# Patient Record
Sex: FEMALE | Race: ASIAN | NOT HISPANIC OR LATINO
[De-identification: names, ages, dates, MRNs, and addresses within clinical notes are randomized per-mention and may not be internally consistent; named-entity substitution may affect disease eponyms.]

---

## 2020-05-25 ENCOUNTER — TRANSCRIPTION ENCOUNTER (OUTPATIENT)
Age: 30
End: 2020-05-25

## 2020-05-26 ENCOUNTER — TRANSCRIPTION ENCOUNTER (OUTPATIENT)
Age: 30
End: 2020-05-26

## 2020-05-26 ENCOUNTER — INPATIENT (INPATIENT)
Facility: HOSPITAL | Age: 30
LOS: 1 days | Discharge: HOME | End: 2020-05-28
Attending: SPECIALIST | Admitting: SPECIALIST

## 2020-05-26 ENCOUNTER — EMERGENCY (EMERGENCY)
Facility: HOSPITAL | Age: 30
LOS: 1 days | Discharge: STILL ADMITTED OR ADMINISTRATIVE | End: 2020-05-26
Admitting: EMERGENCY MEDICINE

## 2020-05-26 VITALS
SYSTOLIC BLOOD PRESSURE: 130 MMHG | HEART RATE: 74 BPM | DIASTOLIC BLOOD PRESSURE: 71 MMHG | TEMPERATURE: 99 F | RESPIRATION RATE: 16 BRPM

## 2020-05-26 VITALS
OXYGEN SATURATION: 96 % | RESPIRATION RATE: 16 BRPM | TEMPERATURE: 99 F | DIASTOLIC BLOOD PRESSURE: 78 MMHG | SYSTOLIC BLOOD PRESSURE: 136 MMHG | HEART RATE: 78 BPM

## 2020-05-26 DIAGNOSIS — O42.92 FULL-TERM PREMATURE RUPTURE OF MEMBRANES, UNSPECIFIED AS TO LENGTH OF TIME BETWEEN RUPTURE AND ONSET OF LABOR: ICD-10-CM

## 2020-05-26 DIAGNOSIS — Z3A.00 WEEKS OF GESTATION OF PREGNANCY NOT SPECIFIED: ICD-10-CM

## 2020-05-26 DIAGNOSIS — O26.899 OTHER SPECIFIED PREGNANCY RELATED CONDITIONS, UNSPECIFIED TRIMESTER: ICD-10-CM

## 2020-05-26 LAB
BASOPHILS # BLD AUTO: 0.05 K/UL — SIGNIFICANT CHANGE UP (ref 0–0.2)
BASOPHILS NFR BLD AUTO: 0.6 % — SIGNIFICANT CHANGE UP (ref 0–2)
BLD GP AB SCN SERPL QL: NEGATIVE — SIGNIFICANT CHANGE UP
EOSINOPHIL # BLD AUTO: 0.17 K/UL — SIGNIFICANT CHANGE UP (ref 0–0.5)
EOSINOPHIL NFR BLD AUTO: 2 % — SIGNIFICANT CHANGE UP (ref 0–6)
HCT VFR BLD CALC: 40.9 % — SIGNIFICANT CHANGE UP (ref 34.5–45)
HGB BLD-MCNC: 13.7 G/DL — SIGNIFICANT CHANGE UP (ref 11.5–15.5)
IMM GRANULOCYTES NFR BLD AUTO: 4.9 % — HIGH (ref 0–1.5)
LYMPHOCYTES # BLD AUTO: 2.24 K/UL — SIGNIFICANT CHANGE UP (ref 1–3.3)
LYMPHOCYTES # BLD AUTO: 27 % — SIGNIFICANT CHANGE UP (ref 13–44)
MCHC RBC-ENTMCNC: 29.7 PG — SIGNIFICANT CHANGE UP (ref 27–34)
MCHC RBC-ENTMCNC: 33.5 % — SIGNIFICANT CHANGE UP (ref 32–36)
MCV RBC AUTO: 88.7 FL — SIGNIFICANT CHANGE UP (ref 80–100)
MONOCYTES # BLD AUTO: 0.75 K/UL — SIGNIFICANT CHANGE UP (ref 0–0.9)
MONOCYTES NFR BLD AUTO: 9 % — SIGNIFICANT CHANGE UP (ref 2–14)
NEUTROPHILS # BLD AUTO: 4.69 K/UL — SIGNIFICANT CHANGE UP (ref 1.8–7.4)
NEUTROPHILS NFR BLD AUTO: 56.5 % — SIGNIFICANT CHANGE UP (ref 43–77)
NRBC # FLD: 0 K/UL — SIGNIFICANT CHANGE UP (ref 0–0)
PLATELET # BLD AUTO: 182 K/UL — SIGNIFICANT CHANGE UP (ref 150–400)
PMV BLD: 11.8 FL — SIGNIFICANT CHANGE UP (ref 7–13)
RBC # BLD: 4.61 M/UL — SIGNIFICANT CHANGE UP (ref 3.8–5.2)
RBC # FLD: 15.8 % — HIGH (ref 10.3–14.5)
RH IG SCN BLD-IMP: POSITIVE — SIGNIFICANT CHANGE UP
RH IG SCN BLD-IMP: POSITIVE — SIGNIFICANT CHANGE UP
SARS-COV-2 RNA SPEC QL NAA+PROBE: SIGNIFICANT CHANGE UP
T PALLIDUM AB TITR SER: NEGATIVE — SIGNIFICANT CHANGE UP
WBC # BLD: 8.31 K/UL — SIGNIFICANT CHANGE UP (ref 3.8–10.5)
WBC # FLD AUTO: 8.31 K/UL — SIGNIFICANT CHANGE UP (ref 3.8–10.5)

## 2020-05-26 RX ORDER — AMPICILLIN TRIHYDRATE 250 MG
1 CAPSULE ORAL EVERY 4 HOURS
Refills: 0 | Status: DISCONTINUED | OUTPATIENT
Start: 2020-05-26 | End: 2020-05-26

## 2020-05-26 RX ORDER — CITRIC ACID/SODIUM CITRATE 300-500 MG
30 SOLUTION, ORAL ORAL ONCE
Refills: 0 | Status: COMPLETED | OUTPATIENT
Start: 2020-05-26 | End: 2020-05-26

## 2020-05-26 RX ORDER — MORPHINE SULFATE 50 MG/1
0.15 CAPSULE, EXTENDED RELEASE ORAL ONCE
Refills: 0 | Status: DISCONTINUED | OUTPATIENT
Start: 2020-05-26 | End: 2020-05-27

## 2020-05-26 RX ORDER — OXYCODONE HYDROCHLORIDE 5 MG/1
5 TABLET ORAL
Refills: 0 | Status: DISCONTINUED | OUTPATIENT
Start: 2020-05-26 | End: 2020-05-27

## 2020-05-26 RX ORDER — HYDROMORPHONE HYDROCHLORIDE 2 MG/ML
1 INJECTION INTRAMUSCULAR; INTRAVENOUS; SUBCUTANEOUS
Refills: 0 | Status: DISCONTINUED | OUTPATIENT
Start: 2020-05-26 | End: 2020-05-27

## 2020-05-26 RX ORDER — KETOROLAC TROMETHAMINE 30 MG/ML
30 SYRINGE (ML) INJECTION EVERY 6 HOURS
Refills: 0 | Status: DISCONTINUED | OUTPATIENT
Start: 2020-05-26 | End: 2020-05-27

## 2020-05-26 RX ORDER — NALOXONE HYDROCHLORIDE 4 MG/.1ML
0.1 SPRAY NASAL
Refills: 0 | Status: DISCONTINUED | OUTPATIENT
Start: 2020-05-26 | End: 2020-05-27

## 2020-05-26 RX ORDER — AMPICILLIN TRIHYDRATE 250 MG
2 CAPSULE ORAL ONCE
Refills: 0 | Status: COMPLETED | OUTPATIENT
Start: 2020-05-26 | End: 2020-05-26

## 2020-05-26 RX ORDER — SODIUM CHLORIDE 9 MG/ML
1000 INJECTION, SOLUTION INTRAVENOUS
Refills: 0 | Status: DISCONTINUED | OUTPATIENT
Start: 2020-05-26 | End: 2020-05-27

## 2020-05-26 RX ORDER — ONDANSETRON 8 MG/1
4 TABLET, FILM COATED ORAL EVERY 6 HOURS
Refills: 0 | Status: DISCONTINUED | OUTPATIENT
Start: 2020-05-26 | End: 2020-05-28

## 2020-05-26 RX ORDER — ACETAMINOPHEN 500 MG
975 TABLET ORAL EVERY 6 HOURS
Refills: 0 | Status: DISCONTINUED | OUTPATIENT
Start: 2020-05-26 | End: 2020-05-28

## 2020-05-26 RX ORDER — OXYTOCIN 10 UNIT/ML
333.33 VIAL (ML) INJECTION
Qty: 20 | Refills: 0 | Status: DISCONTINUED | OUTPATIENT
Start: 2020-05-26 | End: 2020-05-27

## 2020-05-26 RX ORDER — OXYCODONE HYDROCHLORIDE 5 MG/1
10 TABLET ORAL
Refills: 0 | Status: DISCONTINUED | OUTPATIENT
Start: 2020-05-26 | End: 2020-05-27

## 2020-05-26 RX ORDER — HEPARIN SODIUM 5000 [USP'U]/ML
5000 INJECTION INTRAVENOUS; SUBCUTANEOUS EVERY 12 HOURS
Refills: 0 | Status: DISCONTINUED | OUTPATIENT
Start: 2020-05-26 | End: 2020-05-28

## 2020-05-26 RX ORDER — SODIUM CHLORIDE 9 MG/ML
1000 INJECTION, SOLUTION INTRAVENOUS ONCE
Refills: 0 | Status: COMPLETED | OUTPATIENT
Start: 2020-05-26 | End: 2020-05-26

## 2020-05-26 RX ORDER — MAGNESIUM HYDROXIDE 400 MG/1
30 TABLET, CHEWABLE ORAL
Refills: 0 | Status: DISCONTINUED | OUTPATIENT
Start: 2020-05-26 | End: 2020-05-28

## 2020-05-26 RX ORDER — SODIUM CHLORIDE 9 MG/ML
1000 INJECTION, SOLUTION INTRAVENOUS
Refills: 0 | Status: DISCONTINUED | OUTPATIENT
Start: 2020-05-26 | End: 2020-05-26

## 2020-05-26 RX ORDER — OXYCODONE HYDROCHLORIDE 5 MG/1
5 TABLET ORAL
Refills: 0 | Status: DISCONTINUED | OUTPATIENT
Start: 2020-05-26 | End: 2020-05-28

## 2020-05-26 RX ORDER — METOCLOPRAMIDE HCL 10 MG
10 TABLET ORAL ONCE
Refills: 0 | Status: COMPLETED | OUTPATIENT
Start: 2020-05-26 | End: 2020-05-26

## 2020-05-26 RX ORDER — LANOLIN
1 OINTMENT (GRAM) TOPICAL EVERY 6 HOURS
Refills: 0 | Status: DISCONTINUED | OUTPATIENT
Start: 2020-05-26 | End: 2020-05-28

## 2020-05-26 RX ORDER — DIPHENHYDRAMINE HCL 50 MG
25 CAPSULE ORAL EVERY 6 HOURS
Refills: 0 | Status: DISCONTINUED | OUTPATIENT
Start: 2020-05-26 | End: 2020-05-28

## 2020-05-26 RX ORDER — TETANUS TOXOID, REDUCED DIPHTHERIA TOXOID AND ACELLULAR PERTUSSIS VACCINE, ADSORBED 5; 2.5; 8; 8; 2.5 [IU]/.5ML; [IU]/.5ML; UG/.5ML; UG/.5ML; UG/.5ML
0.5 SUSPENSION INTRAMUSCULAR ONCE
Refills: 0 | Status: DISCONTINUED | OUTPATIENT
Start: 2020-05-26 | End: 2020-05-28

## 2020-05-26 RX ORDER — OXYTOCIN 10 UNIT/ML
333.33 VIAL (ML) INJECTION
Qty: 20 | Refills: 0 | Status: DISCONTINUED | OUTPATIENT
Start: 2020-05-26 | End: 2020-05-26

## 2020-05-26 RX ORDER — FAMOTIDINE 10 MG/ML
20 INJECTION INTRAVENOUS ONCE
Refills: 0 | Status: COMPLETED | OUTPATIENT
Start: 2020-05-26 | End: 2020-05-26

## 2020-05-26 RX ORDER — SIMETHICONE 80 MG/1
80 TABLET, CHEWABLE ORAL EVERY 4 HOURS
Refills: 0 | Status: DISCONTINUED | OUTPATIENT
Start: 2020-05-26 | End: 2020-05-28

## 2020-05-26 RX ADMIN — Medication 30 MILLIGRAM(S): at 18:20

## 2020-05-26 RX ADMIN — Medication 10 MILLIGRAM(S): at 06:59

## 2020-05-26 RX ADMIN — SODIUM CHLORIDE 2000 MILLILITER(S): 9 INJECTION, SOLUTION INTRAVENOUS at 06:31

## 2020-05-26 RX ADMIN — ONDANSETRON 4 MILLIGRAM(S): 8 TABLET, FILM COATED ORAL at 14:49

## 2020-05-26 RX ADMIN — Medication 30 MILLIGRAM(S): at 11:40

## 2020-05-26 RX ADMIN — HEPARIN SODIUM 5000 UNIT(S): 5000 INJECTION INTRAVENOUS; SUBCUTANEOUS at 18:20

## 2020-05-26 RX ADMIN — FAMOTIDINE 20 MILLIGRAM(S): 10 INJECTION INTRAVENOUS at 06:59

## 2020-05-26 RX ADMIN — SODIUM CHLORIDE 75 MILLILITER(S): 9 INJECTION, SOLUTION INTRAVENOUS at 11:04

## 2020-05-26 RX ADMIN — Medication 1000 MILLIUNIT(S)/MIN: at 11:04

## 2020-05-26 RX ADMIN — Medication 30 MILLILITER(S): at 07:54

## 2020-05-26 RX ADMIN — Medication 216 GRAM(S): at 06:42

## 2020-05-26 NOTE — OB PROVIDER H&P - ASSESSMENT
28 y/o [place] female, para 1001 @ 38+5 wks spontaneous di/di twins IUP.  Evidence of PROM @ 4:30am (Clear)  GBS Positive  Cephalic/Breech presentation by bedside sonogram.   Plan of care d/w Dr Jeong  Report off to Dr Sahu-Erick [facility]3] done.

## 2020-05-26 NOTE — BRIEF OPERATIVE NOTE - NSICDXBRIEFPOSTOP_GEN_ALL_CORE_FT
POST-OP DIAGNOSIS:  Breech presentation, delivered, current hospitalization 26-May-2020 11:20:16  Emily Ryan S

## 2020-05-26 NOTE — OB PROVIDER H&P - NSHPPHYSICALEXAM_GEN_ALL_CORE
30 y/o  female, para 1001 @ 38+5 wks spontaneous di/di twins IUP.  Evidence of PROM   Gen: NAD; A+O x3  Cardiac: S1/S2, R/R/R  Pulm: CTAB  VS-BP: 118/67, P74, RR16, T 37.0, Pain 3/10  Fetus A: Cat 1 tracing, 135 bpm moderate variability, +accels, no decels  Fetus B: Cat 1 tracing, 135 bpm, moderate variability, +accels, no decels  [place]: q 2-4 min  SSE: Copious amount of clear noted. Nitrazine Positive  SVE: 2/50/-3, Ruptured @ 4:30am.  GBS Positive  TAS: Vertex/Breech

## 2020-05-26 NOTE — BRIEF OPERATIVE NOTE - OPERATION/FINDINGS
infant A: breech female  infant B: vertex male  uterus, tubes and ovaries all grossly intact and normal  Placenta and both 3 vessel cords grossly unremarkable

## 2020-05-26 NOTE — OB PROVIDER TRIAGE NOTE - HISTORY OF PRESENT ILLNESS
Patient of Dr Jeong   28 y/o  female, para 1001 @ 38+5 wks spontaneous di/di twins IUP.  Presents with c/o several gushes of fluid since 4:30am, followed by abdominal pressure.   Pt reports +FM x2, denies any vaginal bleeding, contractions or cramping.     A/P Complications: Positive GBS Bacteruria   Allergies: NKDA  Meds: PNV, Folic acid, Iron  PMH: Denies  PSH: Denies  OBgynHx    2016-Uncomplicated  @ 40+3 wks. Male, 7.6#  Patient denies any h/o: Abn. PAP, ovarian cysts, fibroids, breast problems, STDs/STIs  PSY: Denies  EtOH/ Smoke/ Recreational substance use: Denies  FH: Diabetes (Mother)  H/W/BMI: 63"/160#/28.4 Patient of Dr Jeong   30 y/o  female, para 1001 @ 38+5 wks spontaneous di/di twins IUP.  Presents with c/o several gushes of fluid since 4:30am, followed by abdominal pressure.   Pt reports +FM x2, denies any vaginal bleeding, contractions or cramping.   Patient scheduled for Primary c/s on 2020 for vertex/breech  NPO since 10PM    A/P Complications: Positive GBS Bacteruria   Allergies: NKDA  Meds: PNV, Folic acid, Iron  PMH: Denies  PSH: Denies  OBgynHx    2016-Uncomplicated  @ 40+3 wks. Male, 7.6#  Patient denies any h/o: Abn. PAP, ovarian cysts, fibroids, breast problems, STDs/STIs  PSY: Denies  EtOH/ Smoke/ Recreational substance use: Denies  FH: Diabetes (Mother)  H/W/BMI: 63"/160#/28.4

## 2020-05-26 NOTE — OB PROVIDER TRIAGE NOTE - NS_OBGYNHISTORY_OBGYN_ALL_OB_FT
2016-Uncomplicated  @ 40+3 wks. Male, 7.6#  Patient denies any h/o: Abn. PAP, ovarian cysts, fibroids, breast problems, STDs/STIs

## 2020-05-26 NOTE — OB NEONATOLOGY/PEDIATRICIAN DELIVERY SUMMARY - NSPEDSNEONOTESA_OBGYN_ALL_OB_FT
Baby is a 38+5 wk GA female born to a 29 y/o  mother via CS, unscheduled. Maternal history uncomplicated. Prenatal history uncomplicated. Maternal blood type O+. Prenatal labs negative, non-reactive, and immune. GBS positive on . She received ampicillin at 6:43 am. SROM clear fluids at 04:30 on . Baby born breech, vigorous and crying spontaneously. Warmed, dried, stimulated, suctioned. Apgars 9/9. Mom's highest temp 36.8. EOS 0.04. Mom plans to breastfeed, would like to defer hepB.

## 2020-05-26 NOTE — OB PROVIDER H&P - HISTORY OF PRESENT ILLNESS
Patient of Dr Jeong   30 y/o  female, para 1001 @ 38+5 wks spontaneous di/di twins IUP.  Presents with c/o several gushes of fluid since 4:30am, followed by abdominal pressure.   Pt reports +FM x2, denies any vaginal bleeding, contractions or cramping.   Patient scheduled for Primary c/s on 2020 for vertex/breech  NPO since 10PM    A/P Complications: Positive GBS Bacteruria   Allergies: NKDA  Meds: PNV, Folic acid, Iron  PMH: Denies  PSH: Denies  OBgynHx    2016-Uncomplicated  @ 40+3 wks. Male, 7.6#  Patient denies any h/o: Abn. PAP, ovarian cysts, fibroids, breast problems, STDs/STIs  PSY: Denies  EtOH/ Smoke/ Recreational substance use: Denies  FH: Diabetes (Mother)  H/W/BMI: 63"/160#/28.4

## 2020-05-26 NOTE — OB PROVIDER TRIAGE NOTE - NSHPPHYSICALEXAM_GEN_ALL_CORE
30 y/o  female, para 1001 @ 38+5 wks spontaneous di/di twins IUP.  Evidence of PROM   Gen: NAD; A+O x3  Cardiac: S1/S2, R/R/R  Pulm: CTAB  VS-BP: 118/67, P74, RR16, T 37.0, Pain 3/10  Fetus A: Cat 1 tracing, 135 bpm moderate variability, +accels, no decels  Fetus B: Cat 1 tracing, 135 bpm, moderate variability, +accels, no decels  [place]: q 2-4 min  SSE: Copious amount of clear noted. Nitrazine Positive  SVE: 2/50/-3, Ruptured @ 4:30am.  GBS Positive  TAS: Vertex/Breech

## 2020-05-26 NOTE — DISCHARGE NOTE OB - CARE PLAN
Principal Discharge DX:	 delivery delivered  Goal:	GOOD RECOVERY  Assessment and plan of treatment:	F/U 2 WEEKS

## 2020-05-26 NOTE — OB RN DELIVERY SUMMARY - NS_SEPSISRSKCALC_OBGYN_ALL_OB_FT
EOS calculated successfully. EOS Risk Factor: 0.5/1000 live births ([facility] national incidence); GA=38w5d; Temp=98.6; ROM=4.433; GBS='Positive'; Antibiotics='GBS specific antibiotics > 2 hrs prior to birth'

## 2020-05-26 NOTE — DISCHARGE NOTE OB - CARE PROVIDER_API CALL
Lisa Jeong  OBSTETRICS AND GYNECOLOGY  [address] [place] [address] [address]  [place] [address]  Phone: [phone]  Fax: [phone]  Follow Up Time:

## 2020-05-26 NOTE — OB NEONATOLOGY/PEDIATRICIAN DELIVERY SUMMARY - NSPEDSNEONOTESB_OBGYN_ALL_OB_FT
Baby "SHANNON" is a 38 and 5 week M twin born via C/S to a [id number] mother, presented in labor with SROM @430 clear. GBS+ 5/4, treated, PNl neg/NR/I. Baby "SHANNON" emerged vertex and crying, no delayed cord clamping, WDSS< APGARS 9,9. Breast and bottle, no hep b, circ. NBN.

## 2020-05-26 NOTE — OB PROVIDER DELIVERY SUMMARY - AS DELIV COMPLICATIONS OB
Consult received. History, labs and CT scan images reviewed. Pan colitis of unknown etiology.   Start Cipro/flagyl    Rosealee Clamp, DO none

## 2020-05-26 NOTE — ED ADULT TRIAGE NOTE - CHIEF COMPLAINT QUOTE
pt 38 weeks pregnant,  (pregnant with twins), NIRMAL- - scheduled . +ROM @ 430am. states some mild discomfort, no contractions. GYN- Steve . spoke to Sierra in L+D, pt taken to L+D by EDT.

## 2020-05-26 NOTE — DISCHARGE NOTE OB - PATIENT PORTAL LINK FT
You can access the FollowMyHealth Patient Portal offered by [facility] by registering at the following website: [email or web address]. By joining [employer]’s FollowMyHealth portal, you will also be able to view your health information using other applications (apps) compatible with our system.

## 2020-05-26 NOTE — OB PROVIDER H&P - PROBLEM SELECTOR PLAN 1
Admit to L&D  -For Primary c/s in the setting of Twins/PROM/Cephalic/Breech  -Routine labs/ Covid PCR  -Ampicillin GBS Prophylaxis  -Anesthesia Consult

## 2020-05-26 NOTE — OB PROVIDER TRIAGE NOTE - NSHPLABSRESULTS_GEN_ALL_CORE
Vital Signs Last 24 Hrs  T(C): 37 (26 May 2020 05:55), Max: 37 (26 May 2020 05:22)  T(F): 98.6 (26 May 2020 05:55), Max: 98.6 (26 May 2020 05:22)  HR: 74 (26 May 2020 06:06) (74 - 78)  BP: 118/67 (26 May 2020 05:55) (118/67 - 136/78)  BP(mean): --  RR: 16 (26 May 2020 05:55) (16 - 16)  SpO2: 96% (26 May 2020 05:22) (96% - 96%)

## 2020-05-27 DIAGNOSIS — Z01.818 ENCOUNTER FOR OTHER PREPROCEDURAL EXAMINATION: ICD-10-CM

## 2020-05-27 PROBLEM — Z00.00 ENCOUNTER FOR PREVENTIVE HEALTH EXAMINATION: Status: ACTIVE | Noted: 2020-05-27

## 2020-05-27 LAB
BASOPHILS # BLD AUTO: 0.03 K/UL — SIGNIFICANT CHANGE UP (ref 0–0.2)
BASOPHILS NFR BLD AUTO: 0.3 % — SIGNIFICANT CHANGE UP (ref 0–2)
EOSINOPHIL # BLD AUTO: 0.03 K/UL — SIGNIFICANT CHANGE UP (ref 0–0.5)
EOSINOPHIL NFR BLD AUTO: 0.3 % — SIGNIFICANT CHANGE UP (ref 0–6)
HCT VFR BLD CALC: 26.8 % — LOW (ref 34.5–45)
HGB BLD-MCNC: 9.3 G/DL — LOW (ref 11.5–15.5)
IMM GRANULOCYTES NFR BLD AUTO: 2 % — HIGH (ref 0–1.5)
LYMPHOCYTES # BLD AUTO: 1.94 K/UL — SIGNIFICANT CHANGE UP (ref 1–3.3)
LYMPHOCYTES # BLD AUTO: 19.8 % — SIGNIFICANT CHANGE UP (ref 13–44)
MCHC RBC-ENTMCNC: 31.4 PG — SIGNIFICANT CHANGE UP (ref 27–34)
MCHC RBC-ENTMCNC: 34.7 % — SIGNIFICANT CHANGE UP (ref 32–36)
MCV RBC AUTO: 90.5 FL — SIGNIFICANT CHANGE UP (ref 80–100)
MONOCYTES # BLD AUTO: 0.82 K/UL — SIGNIFICANT CHANGE UP (ref 0–0.9)
MONOCYTES NFR BLD AUTO: 8.4 % — SIGNIFICANT CHANGE UP (ref 2–14)
NEUTROPHILS # BLD AUTO: 6.78 K/UL — SIGNIFICANT CHANGE UP (ref 1.8–7.4)
NEUTROPHILS NFR BLD AUTO: 69.2 % — SIGNIFICANT CHANGE UP (ref 43–77)
NRBC # FLD: 0.02 K/UL — SIGNIFICANT CHANGE UP (ref 0–0)
PLATELET # BLD AUTO: 175 K/UL — SIGNIFICANT CHANGE UP (ref 150–400)
PMV BLD: 12.3 FL — SIGNIFICANT CHANGE UP (ref 7–13)
RBC # BLD: 2.96 M/UL — LOW (ref 3.8–5.2)
RBC # FLD: 15.5 % — HIGH (ref 10.3–14.5)
WBC # BLD: 9.8 K/UL — SIGNIFICANT CHANGE UP (ref 3.8–10.5)
WBC # FLD AUTO: 9.8 K/UL — SIGNIFICANT CHANGE UP (ref 3.8–10.5)

## 2020-05-27 RX ORDER — FERROUS SULFATE 325(65) MG
325 TABLET ORAL THREE TIMES A DAY
Refills: 0 | Status: DISCONTINUED | OUTPATIENT
Start: 2020-05-27 | End: 2020-05-28

## 2020-05-27 RX ORDER — IBUPROFEN 200 MG
600 TABLET ORAL EVERY 6 HOURS
Refills: 0 | Status: COMPLETED | OUTPATIENT
Start: 2020-05-27 | End: 2021-04-25

## 2020-05-27 RX ORDER — ASCORBIC ACID 60 MG
500 TABLET,CHEWABLE ORAL DAILY
Refills: 0 | Status: DISCONTINUED | OUTPATIENT
Start: 2020-05-27 | End: 2020-05-28

## 2020-05-27 RX ORDER — FERROUS SULFATE 325(65) MG
325 TABLET ORAL DAILY
Refills: 0 | Status: DISCONTINUED | OUTPATIENT
Start: 2020-05-27 | End: 2020-05-27

## 2020-05-27 RX ORDER — IBUPROFEN 200 MG
600 TABLET ORAL EVERY 6 HOURS
Refills: 0 | Status: DISCONTINUED | OUTPATIENT
Start: 2020-05-27 | End: 2020-05-28

## 2020-05-27 RX ADMIN — Medication 600 MILLIGRAM(S): at 12:16

## 2020-05-27 RX ADMIN — HEPARIN SODIUM 5000 UNIT(S): 5000 INJECTION INTRAVENOUS; SUBCUTANEOUS at 06:36

## 2020-05-27 RX ADMIN — Medication 975 MILLIGRAM(S): at 15:25

## 2020-05-27 RX ADMIN — Medication 30 MILLIGRAM(S): at 06:36

## 2020-05-27 RX ADMIN — Medication 600 MILLIGRAM(S): at 18:19

## 2020-05-27 RX ADMIN — SIMETHICONE 80 MILLIGRAM(S): 80 TABLET, CHEWABLE ORAL at 15:26

## 2020-05-27 RX ADMIN — Medication 30 MILLIGRAM(S): at 00:30

## 2020-05-27 RX ADMIN — Medication 975 MILLIGRAM(S): at 21:25

## 2020-05-27 RX ADMIN — HEPARIN SODIUM 5000 UNIT(S): 5000 INJECTION INTRAVENOUS; SUBCUTANEOUS at 18:20

## 2020-05-27 RX ADMIN — Medication 1 TABLET(S): at 15:16

## 2020-05-27 RX ADMIN — Medication 500 MILLIGRAM(S): at 15:16

## 2020-05-27 RX ADMIN — SIMETHICONE 80 MILLIGRAM(S): 80 TABLET, CHEWABLE ORAL at 18:20

## 2020-05-27 RX ADMIN — Medication 325 MILLIGRAM(S): at 15:16

## 2020-05-27 NOTE — PROVIDER CONTACT NOTE (OTHER) - SITUATION
patient status post straight cath from this morning ,unable to urinate after three attempts, bladder palpable and patient complains of feeling  pressure

## 2020-05-27 NOTE — PROVIDER CONTACT NOTE (OTHER) - ACTION/TREATMENT ORDERED:
catheterized and obtained 600 cc clear urine, NP notified and indwelling flowers left in place as ordered. flowers to be d/cd at [facility]

## 2020-05-27 NOTE — PROGRESS NOTE ADULT - SUBJECTIVE AND OBJECTIVE BOX
Postop Day  __1_ s/p   C- Section    THERAPY:  [ x ] Spinal morphine   [  ] Epidural morphine   [  ] IV PCA Hydromorphone 1 mg/ml    T(C): 36.6 (05-27-20 @ 10:10), Max: 36.8 (05-27-20 @ 06:38)  HR: 87 (05-27-20 @ 10:10) (78 - 87)  BP: 118/62 (05-27-20 @ 10:10) (106/71 - 118/62)  RR: 18 (05-27-20 @ 10:10) (16 - 18)  SpO2: 98% (05-27-20 @ 10:10) (98% - 100%)    MEDICATIONS  (STANDING):  acetaminophen   Tablet .. 975 milliGRAM(s) Oral every 6 hours  ascorbic acid 500 milliGRAM(s) Oral daily  diphtheria/tetanus/pertussis (acellular) Vaccine (ADAcel) 0.5 milliLiter(s) IntraMuscular once  ferrous    sulfate 325 milliGRAM(s) Oral three times a day  heparin   Injectable 5000 Unit(s) SubCutaneous every 12 hours  prenatal multivitamin 1 Tablet(s) Oral daily    MEDICATIONS  (PRN):  diphenhydrAMINE 25 milliGRAM(s) Oral every 6 hours PRN Itching  lanolin Ointment 1 Application(s) Topical every 6 hours PRN Sore Nipples  magnesium hydroxide Suspension 30 milliLiter(s) Oral two times a day PRN Constipation  naloxone Injectable 0.1 milliGRAM(s) IV Push every 3 minutes PRN For ANY of the following changes in patient status:  A. RR LESS THAN 10 breaths per minute, B. Oxygen saturation LESS THAN 90%, C. Sedation score of 6  ondansetron Injectable 4 milliGRAM(s) IV Push every 6 hours PRN Nausea  oxyCODONE    IR 5 milliGRAM(s) Oral every 3 hours PRN Moderate Pain (4 - 6)  simethicone 80 milliGRAM(s) Chew every 4 hours PRN Gas      Pain:   _________mild __ at rest;  ______moderate_____with activity    Sedation Score:	  [ x ] Alert	    [  ] Drowsy        [  ] Arousable	[  ] Asleep	[  ] Unresponsive    Side Effects:	  [  x] None	     [  ] Nausea        [  ] Pruritus        [  ] Weakness   [  ] Numbness        ASSESSMENT/ PLAN  [   ] Side effects resolving      [ x  ] Patient made aware of PRN meds available     [ x] Discontinue & switch to PRN pain medications        [  ] Continue       Patient states lower extremities feel and move normally. No apparent anesthetic complications.

## 2020-05-28 ENCOUNTER — APPOINTMENT (OUTPATIENT)
Dept: DISASTER EMERGENCY | Facility: CLINIC | Age: 30
End: 2020-05-28

## 2020-05-28 VITALS
DIASTOLIC BLOOD PRESSURE: 70 MMHG | TEMPERATURE: 98 F | SYSTOLIC BLOOD PRESSURE: 110 MMHG | HEART RATE: 74 BPM | OXYGEN SATURATION: 99 % | RESPIRATION RATE: 18 BRPM

## 2020-05-28 RX ORDER — BENZOYL PEROXIDE MICRONIZED 5.8 %
1 TOWELETTE (EA) TOPICAL
Qty: 0 | Refills: 0 | DISCHARGE

## 2020-05-28 RX ORDER — IBUPROFEN 200 MG
1 TABLET ORAL
Qty: 0 | Refills: 0 | DISCHARGE
Start: 2020-05-28

## 2020-05-28 RX ORDER — FOLIC ACID 0.8 MG
1 TABLET ORAL
Qty: 0 | Refills: 0 | DISCHARGE

## 2020-05-28 RX ORDER — ACETAMINOPHEN 500 MG
3 TABLET ORAL
Qty: 0 | Refills: 0 | DISCHARGE
Start: 2020-05-28

## 2020-05-28 RX ADMIN — Medication 600 MILLIGRAM(S): at 00:11

## 2020-05-28 RX ADMIN — Medication 600 MILLIGRAM(S): at 06:10

## 2020-05-28 RX ADMIN — Medication 325 MILLIGRAM(S): at 06:11

## 2020-05-28 RX ADMIN — Medication 975 MILLIGRAM(S): at 03:28

## 2020-05-28 RX ADMIN — HEPARIN SODIUM 5000 UNIT(S): 5000 INJECTION INTRAVENOUS; SUBCUTANEOUS at 06:10

## 2020-05-28 RX ADMIN — SIMETHICONE 80 MILLIGRAM(S): 80 TABLET, CHEWABLE ORAL at 00:12

## 2020-05-28 NOTE — PROGRESS NOTE ADULT - SUBJECTIVE AND OBJECTIVE BOX
Postpartum Note,  Section  She is a  29y woman who is now post-operative day: 2    Subjective:  The patient feels well.  She is ambulating.   She is tolerating regular diet.  She denies nausea and vomiting.  She is voiding.  Her pain is controlled.  She reports normal postpartum bleeding.    Vital Signs Last 24 Hrs  T(C): 36.7 (28 May 2020 05:48), Max: 37.3 (27 May 2020 22:48)  T(F): 98.1 (28 May 2020 05:48), Max: 99.2 (27 May 2020 22:48)  HR: 74 (28 May 2020 05:48) (74 - 89)  BP: 110/70 (28 May 2020 05:48) (101/54 - 118/62)  BP(mean): --  RR: 18 (28 May 2020 05:48) (18 - 18)  SpO2: 99% (28 May 2020 05:48) (98% - 100%)    Physical exam:  Gen: NAD  Breast: Soft, nontender, not engorged.  Abdomen: Soft, nontender, no distension , firm uterine fundus at umbilicus.  Incision: Clean, dry, and intact with steri strips  Pelvic: Normal lochia noted  Ext: No calf tenderness    LABS:                        9.3    9.80  )-----------( 175      ( 27 May 2020 06:30 )             26.8         Allergies    No Known Allergies    Intolerances      MEDICATIONS  (STANDING):  acetaminophen   Tablet .. 975 milliGRAM(s) Oral every 6 hours  ascorbic acid 500 milliGRAM(s) Oral daily  diphtheria/tetanus/pertussis (acellular) Vaccine (ADAcel) 0.5 milliLiter(s) IntraMuscular once  ferrous    sulfate 325 milliGRAM(s) Oral three times a day  heparin   Injectable 5000 Unit(s) SubCutaneous every 12 hours  ibuprofen  Tablet. 600 milliGRAM(s) Oral every 6 hours  prenatal multivitamin 1 Tablet(s) Oral daily    MEDICATIONS  (PRN):  diphenhydrAMINE 25 milliGRAM(s) Oral every 6 hours PRN Itching  lanolin Ointment 1 Application(s) Topical every 6 hours PRN Sore Nipples  magnesium hydroxide Suspension 30 milliLiter(s) Oral two times a day PRN Constipation  oxyCODONE    IR 5 milliGRAM(s) Oral every 3 hours PRN Moderate Pain (4 - 6)  simethicone 80 milliGRAM(s) Chew every 4 hours PRN Gas        Assessment and Plan  POD #2  s/p  section  Doing well.  Encourage ambulation.

## 2023-01-03 ENCOUNTER — EMERGENCY (EMERGENCY)
Facility: HOSPITAL | Age: 33
LOS: 1 days | Discharge: HOME | End: 2023-01-03
Attending: STUDENT IN AN ORGANIZED HEALTH CARE EDUCATION/TRAINING PROGRAM | Admitting: STUDENT IN AN ORGANIZED HEALTH CARE EDUCATION/TRAINING PROGRAM
Payer: MEDICAID

## 2023-01-03 VITALS
OXYGEN SATURATION: 98 % | DIASTOLIC BLOOD PRESSURE: 75 MMHG | RESPIRATION RATE: 18 BRPM | TEMPERATURE: 98 F | HEART RATE: 89 BPM | SYSTOLIC BLOOD PRESSURE: 125 MMHG

## 2023-01-03 PROCEDURE — 73130 X-RAY EXAM OF HAND: CPT | Mod: 26,RT

## 2023-01-03 PROCEDURE — 99284 EMERGENCY DEPT VISIT MOD MDM: CPT

## 2023-01-03 RX ORDER — IBUPROFEN 200 MG
600 TABLET ORAL ONCE
Refills: 0 | Status: COMPLETED | OUTPATIENT
Start: 2023-01-03 | End: 2023-01-03

## 2023-01-03 RX ADMIN — Medication 600 MILLIGRAM(S): at 10:39

## 2023-01-03 NOTE — ED PROVIDER NOTE - ATTENDING CONTRIBUTION TO CARE
with student   32-year-old female with no past medical history presenting with right pinky injury. Patient states she was at home with the kids one of the kids was running 2 weeks ago and she tried to grab him. She caught her finger in his T-shirt and had pain at the distal tip of her right pinky. Patient with decreased ability to extend right pinky. No fever no chills no nausea no vomiting. Denies any other trauma. Has been taking Tylenol for pain. Has inability with extension a few pinky held and slight flexion  denies fever, chills, chest pain, SOB, abdominal pain, diarrhea, dysuria, syncope, bleeding, new rash,weakness, numbness, blurred vision    ROS  otherwise negative as per HPI  Gen: Awake, Alert, WD, WN, NAD  Head:  NC/AT  Eyes:  PERRL, EOMI, Conjunctiva pink, lids normal, no scleral icterus  ENT: OP clear,moist mucus membranes  Neck: supple, nontender, no meningismus, no JVD, trachea midline  Cardiac/CV:  S1 S2, RRR, no M/G/R  Respiratory/Pulm:  CTAB, good air movement, normal resp effort, no wheezes/stridor/retractions/rales/rhonchi  Gastrointestinal/Abdomen:  Soft, nontender, nondistended, +BS, no rebound/guarding  Back:  no CVAT, no MLT  Ext:  warm, well perfused, moving all extremities spontaneously, no peripheral edema, distal pulses intact DIP of right 5th digit in flexion, lack of ability to extend DIP on right 5th digit ,   Skin: intact, no rash   Neuro:  AAOx3, sensation intact, motor 5/5 x 4 extremities, normal gait, speech clear  MDM as above

## 2023-01-03 NOTE — ED PROVIDER NOTE - NS ED ATTENDING STATEMENT MOD
I have seen and examined this patient and fully participated in the care of this patient as the teaching attending.  The service was shared with the CARMEN.  I reviewed and verified the documentation and independently performed the documented:

## 2023-01-03 NOTE — ED PROVIDER NOTE - CLINICAL SUMMARY MEDICAL DECISION MAKING FREE TEXT BOX
33 y/o F w/ no significant PMH sent in by PCP for evaluation of right pinky finger. Pt is c/o inability to extend R pinky finger, moderate pain of the finger.  Mechanism of injury: 2 weeks ago, pt grabbed her son by the finger and he moved forward.  Denies fever, chills, malaise, chest pain, SOB, n/v/d, dysuria.  X-Ray of R hand to r/o fracture and reassess, pain control. Most likely tear in extensor tendon.

## 2023-01-03 NOTE — ED ADULT NURSE NOTE - OBJECTIVE STATEMENT
33 y/o F AAOx4, ambulatory at baseline. Patient coming to ER complaining of right pinky injury. Sent by pcp for evaluation noted to have inability to extend right pinky. Patient denies chest pain, headache and dizziness. Breathing even and unlabored. Medications administered as per eMAR.  Awaiting x-ray.

## 2023-01-03 NOTE — ED PROVIDER NOTE - OBJECTIVE STATEMENT
31 y/o F w/ no significant PMH sent in by PCP for evaluation of right pinky finger. Pt is c/o inability to extend R pinky finger, moderate pain of the finger.  Mechanism of injury: 2 weeks ago, pt grabbed her son by the finger and he moved forward.  Denies fever, chills, malaise, chest pain, SOB, n/v/d, dysuria.

## 2023-01-03 NOTE — ED PROVIDER NOTE - NSFOLLOWUPINSTRUCTIONS_ED_ALL_ED_FT
Follow-up with hand surgeon Dr. Fritz within 1 week, office information listed on the following page please call to make an appointment.    Follow-up with your primary care doctor within 1 week.    Keep splint intact at all times, including during the night.    Take ibuprofen 600 mg every 6-8 hours as needed for pain, take with food  -You can also take Tylenol 650mg every 6 hours as needed for pain  Return to the ER with any worsening or concerning symptoms, increased pain, numbness, swelling, skin changes or any other concerns.

## 2023-01-03 NOTE — ED PROVIDER NOTE - CARE PROVIDER_API CALL
Wilian Fritz (DO)  Plastic Surgery  [address] [place] [address]  Phone: [phone]  Fax: [phone]  Follow Up Time:

## 2023-01-03 NOTE — ED PROVIDER NOTE - PATIENT PORTAL LINK FT
You can access the FollowMyHealth Patient Portal offered by Health system by registering at the following website: [email or web address]. By joining [employer]’s FollowMyHealth portal, you will also be able to view your health information using other applications (apps) compatible with our system.

## 2023-01-03 NOTE — ED PROVIDER NOTE - PROGRESS NOTE DETAILS
JIMENA Toribio: xray without evidence of fracture, concern for mallet finger. Spoke with on call hand surgeon , recommending splint to back of the finger covering DIP/PIP, pt can f/u with him in the office. Discussed with pt she is to leave the splint intact at all times. Ibuprofen/tylenol as needed for pain. Pt will return to the ER with any worsening or concerning symptoms, increased pain, numbness, swelling, skin changes or any other concerns. JIMENA Toribio: xray without evidence of fracture, concern for mallet finger. Spoke with on call hand surgeon , recommending splint to back of the finger covering DIP/PIP, pt can f/u with him in the office. Discussed with pt she is to leave the splint intact at all times. Ibuprofen/tylenol as needed for pain. Pt will return to the ER with any worsening or concerning symptoms, increased pain, numbness, swelling, skin changes or any other concerns. Hellen  ID [id number]

## 2023-01-03 NOTE — ED ADULT TRIAGE NOTE - CHIEF COMPLAINT QUOTE
p/t c/o of pain and deformity to rt pinky s/p injury 2 days ago, denies any other discomfort, nad noted

## 2024-03-05 NOTE — OB RN PATIENT PROFILE - WEIGHT: TOTAL WEIGHT IN KG
28-year-old female with past medical history of migraines presents for evaluation of dizziness.  Patient endorses intermittent episodes of dizziness x 1 year, aggravated with changing head position, improved at rest.  No associated left frontal headache.  Denies fever, vision change, neck pain, chest pain, shortness of breath, weakness, numbness dysuria, hematuria, diarrhea. 16

## 2024-11-14 NOTE — OB RN INTRAOPERATIVE NOTE - BABY B: DATE/TIME, DELIVERY
November 14, 2024        Kelly Mancia  [address]  [facility] [id number]    Charlie Mendoza,      Your health and wellness have never been more important. We have been trying to reach you to talk to you about scheduling a health and wellness visit to help you better manage your health and stay safe during these uncertain times.  This visit can be done in the comfort of your home with a Nurse Practitioner from [facility].      Key benefits for YOU?   Convenience: Our Nurse Practitioner can do screenings in your home that you would have to go out to the clinic for. They will talk about important care for you and help you set health goals for the new year.    Health exam:  The Nurse Practitioner will check your blood pressure, screen for diabetes, and check your lungs by doing a quick breathing test.   Better manage your health: The friendly, quality provider will review your medications, medical history, and talk about your health concerns and questions.  Take your time: The appointment is 45 minutes, allowing plenty of time to get to know your provider, review your health information, and talk about your health goals.  Get the care you need: The provider will share your health information, concerns, and priorities with your doctor, so that your doctor and care team can help you get the care you need, when you need it.    Next steps  To take advantage of this new program, please call us back to schedule an appointment at [phone], Monday - Friday, 8 a.m. ? 4 p.m. CST.  We can also answer any questions you may have about this appointment.      Be well!  Your health care partners at [facility]        26-May-2020 08:57